# Patient Record
Sex: FEMALE | Race: WHITE | Employment: FULL TIME | URBAN - METROPOLITAN AREA
[De-identification: names, ages, dates, MRNs, and addresses within clinical notes are randomized per-mention and may not be internally consistent; named-entity substitution may affect disease eponyms.]

---

## 2022-01-12 ENCOUNTER — APPOINTMENT (OUTPATIENT)
Dept: CT IMAGING | Facility: CLINIC | Age: 32
End: 2022-01-12
Attending: PHYSICIAN ASSISTANT
Payer: COMMERCIAL

## 2022-01-12 ENCOUNTER — HOSPITAL ENCOUNTER (EMERGENCY)
Facility: CLINIC | Age: 32
Discharge: HOME | End: 2022-01-12
Attending: PHYSICIAN ASSISTANT | Admitting: PHYSICIAN ASSISTANT
Payer: COMMERCIAL

## 2022-01-12 VITALS
WEIGHT: 165 LBS | OXYGEN SATURATION: 96 % | HEIGHT: 68 IN | BODY MASS INDEX: 25.01 KG/M2 | SYSTOLIC BLOOD PRESSURE: 136 MMHG | DIASTOLIC BLOOD PRESSURE: 101 MMHG | TEMPERATURE: 98.2 F | RESPIRATION RATE: 16 BRPM | HEART RATE: 85 BPM

## 2022-01-12 DIAGNOSIS — S09.90XA CLOSED HEAD INJURY, INITIAL ENCOUNTER: ICD-10-CM

## 2022-01-12 DIAGNOSIS — S01.512A LACERATION OF MOUTH, INITIAL ENCOUNTER: ICD-10-CM

## 2022-01-12 PROCEDURE — 250N000011 HC RX IP 250 OP 636: Performed by: PHYSICIAN ASSISTANT

## 2022-01-12 PROCEDURE — 90471 IMMUNIZATION ADMIN: CPT | Performed by: PHYSICIAN ASSISTANT

## 2022-01-12 PROCEDURE — 99284 EMERGENCY DEPT VISIT MOD MDM: CPT | Mod: 25

## 2022-01-12 PROCEDURE — 90715 TDAP VACCINE 7 YRS/> IM: CPT | Performed by: PHYSICIAN ASSISTANT

## 2022-01-12 PROCEDURE — 250N000013 HC RX MED GY IP 250 OP 250 PS 637: Performed by: PHYSICIAN ASSISTANT

## 2022-01-12 PROCEDURE — 70486 CT MAXILLOFACIAL W/O DYE: CPT

## 2022-01-12 PROCEDURE — 70450 CT HEAD/BRAIN W/O DYE: CPT

## 2022-01-12 RX ORDER — ACETAMINOPHEN 500 MG
1000 TABLET ORAL ONCE
Status: COMPLETED | OUTPATIENT
Start: 2022-01-12 | End: 2022-01-12

## 2022-01-12 RX ADMIN — ACETAMINOPHEN 1000 MG: 500 TABLET ORAL at 20:30

## 2022-01-12 RX ADMIN — CLOSTRIDIUM TETANI TOXOID ANTIGEN (FORMALDEHYDE INACTIVATED), CORYNEBACTERIUM DIPHTHERIAE TOXOID ANTIGEN (FORMALDEHYDE INACTIVATED), BORDETELLA PERTUSSIS TOXOID ANTIGEN (GLUTARALDEHYDE INACTIVATED), BORDETELLA PERTUSSIS FILAMENTOUS HEMAGGLUTININ ANTIGEN (FORMALDEHYDE INACTIVATED), BORDETELLA PERTUSSIS PERTACTIN ANTIGEN, AND BORDETELLA PERTUSSIS FIMBRIAE 2/3 ANTIGEN 0.5 ML: 5; 2; 2.5; 5; 3; 5 INJECTION, SUSPENSION INTRAMUSCULAR at 20:37

## 2022-01-12 ASSESSMENT — MIFFLIN-ST. JEOR
SCORE: 1442.57
SCORE: 1521.94

## 2022-01-12 ASSESSMENT — ENCOUNTER SYMPTOMS
FEVER: 0
NECK PAIN: 0
ARTHRALGIAS: 1
WOUND: 1
ABDOMINAL PAIN: 0
HEADACHES: 1
VOMITING: 0

## 2022-01-13 NOTE — ED PROVIDER NOTES
"  History   Chief Complaint:  Fall       The history is provided by the patient.      Alicia Barrios is a 31 year old female who presents with fall. The patient reports that she slipped in the shower about 2 hours ago, falling and hitting the right side of her face. She notes right-sided jaw pain and headache. She endorses an abrasion to her right chin. The patient has not taken any medications for her pain. She is unsure of the time of her last tetanus vaccine. The patient denies syncope. She is not experiencing vomiting or fever. She also reports no neck pain, chest pain, abdominal pain, or other arthralgias.    Review of Systems   Constitutional: Negative for fever.   Cardiovascular: Negative for chest pain.   Gastrointestinal: Negative for abdominal pain and vomiting.   Musculoskeletal: Positive for arthralgias (R jaw). Negative for neck pain.   Skin: Positive for wound (chin).   Neurological: Positive for headaches. Negative for syncope.   All other systems reviewed and are negative.    Allergies:  Penicillins    Medications:  The patient is currently on no regular medications.     Past Medical History:     The patient denies past medical history.       Past Surgical History:    Rhinoplasty     Family History:    The patient denies past family history.     Social History:  Presents to the emergency department alone  Visiting from [place] for  training  Patient's father is a neurosurgeon    Physical Exam     Patient Vitals for the past 24 hrs:   BP Temp Temp src Pulse Resp SpO2 Height Weight   01/12/22 1921 (!) 136/101 -- -- 85 16 96 % 1.727 m (5' 8\") 74.8 kg (165 lb)   01/12/22 1807 (!) 142/78 98.2  F (36.8  C) Temporal 75 18 99 % 1.6 m (5' 3\") 74.8 kg (165 lb)       Physical Exam  General: Well appearing, pleasant female, resting on exam bed. SINDY COMA SCALE 15.  HEENT: No scalp tenderness.  Pupils equal, round, reactive to light.  Conjunctive are clear.  Extraocular eye movements intact.  Neck range of " motion intact.  Nose and throat clear. TM's normal.   No evidence of rhinorrhea, otorrhea, cerrato sign.  Cervical spine is nontender.    Respiratory: Good breath sounds.    Cardiovascular: Normal rate and rhythm.   Gastrointestinal: Soft, nontender.    Musculoskeletal: Active range of motion in all extremities. Proximal and distal motor strength intact.   No facial tenderness.  Skin: Patient has an abrasion to her right chin which does not violate the dermis.  She also has a laceration inside her mouth.  Neurologic: Gross motor intact.  Alert and oriented x4.  Psych:  Patient is cooperative, with normal affect.  They demonstrate appropriate behavior for age.      Emergency Department Course     Imaging:  CT Facial Bones without Contrast   Final Result   IMPRESSION:   HEAD CT:   1.  Unremarkable head CT with no acute intracranial abnormality.      FACIAL BONE CT:   1.  No facial bone or mandibular fracture.   2.  Leftward nasal septal deviation.      Head CT w/o contrast   Final Result   IMPRESSION:   HEAD CT:   1.  Unremarkable head CT with no acute intracranial abnormality.      FACIAL BONE CT:   1.  No facial bone or mandibular fracture.   2.  Leftward nasal septal deviation.        Report per radiology      Emergency Department Course:  Reviewed:  I reviewed nursing notes, vitals and Care Everywhere    Assessments:  1924 I obtained history and examined the patient as noted above.   1938 I rechecked the patient. Her father requested a CT scan.  2032 I rechecked the patient and explained findings.    Interventions:  2030 Tylenol 1000 mg PO  2037 Tdap 0.5 mL IM    Disposition:  The patient was discharged to home.     Impression & Plan     Medical Decision Making:  Alicia Barrios is a 31 year old female who presents to the emergency room today for evaluation of a head injury and mouth laceration that occurred shortly before arrival.  See HPI.  Her vitals are unremarkable.  She has a small abrasion to the right side of  her chin, in addition to a laceration inside her mouth.  Her teeth are intact.  There is no evidence of any foreign bodies in her laceration.  Her tetanus was updated here.  I discussed with the patient that I have a low suspicion for any significant head trauma and did not recommend a CT of her head or her face. However, her father is her neurosurgeon and her and her family would prefer imaging today.  CT results are unremarkable.  Symptomatic care is indicated and she is to return with new or worsening symptoms.  Laceration care discussed.  We will leave the mouth to heal by secondary intention.  No other injuries noted.  Stable, discharge home, return if worse, follow-up as needed.    Diagnosis:    ICD-10-CM    1. Closed head injury, initial encounter  S09.90XA    2. Laceration of mouth, initial encounter  S01.512A        Scribe Disclosure:  I, Enrique Campuzano, am serving as a scribe at 7:30 PM on 1/12/2022 to document services personally performed by Spencer Kaye PA-C based on my observations and the provider's statements to me.              Spencer Kaye PA-C  01/12/22 1993

## 2022-02-06 ENCOUNTER — HEALTH MAINTENANCE LETTER (OUTPATIENT)
Age: 32
End: 2022-02-06

## 2022-10-03 ENCOUNTER — HEALTH MAINTENANCE LETTER (OUTPATIENT)
Age: 32
End: 2022-10-03

## 2023-02-12 ENCOUNTER — HEALTH MAINTENANCE LETTER (OUTPATIENT)
Age: 33
End: 2023-02-12

## 2024-03-09 ENCOUNTER — HEALTH MAINTENANCE LETTER (OUTPATIENT)
Age: 34
End: 2024-03-09